# Patient Record
Sex: MALE | Race: WHITE | NOT HISPANIC OR LATINO | URBAN - METROPOLITAN AREA
[De-identification: names, ages, dates, MRNs, and addresses within clinical notes are randomized per-mention and may not be internally consistent; named-entity substitution may affect disease eponyms.]

---

## 2017-07-25 ENCOUNTER — APPOINTMENT (RX ONLY)
Dept: URBAN - METROPOLITAN AREA OTHER 13 | Facility: OTHER | Age: 72
Setting detail: DERMATOLOGY
End: 2017-07-25

## 2017-07-25 DIAGNOSIS — L40.0 PSORIASIS VULGARIS: ICD-10-CM

## 2017-07-25 DIAGNOSIS — L40.59 OTHER PSORIATIC ARTHROPATHY: ICD-10-CM

## 2017-07-25 DIAGNOSIS — Z79.899 OTHER LONG TERM (CURRENT) DRUG THERAPY: ICD-10-CM

## 2017-07-25 PROBLEM — I10 ESSENTIAL (PRIMARY) HYPERTENSION: Status: ACTIVE | Noted: 2017-07-25

## 2017-07-25 PROBLEM — M12.9 ARTHROPATHY, UNSPECIFIED: Status: ACTIVE | Noted: 2017-07-25

## 2017-07-25 PROBLEM — E13.9 OTHER SPECIFIED DIABETES MELLITUS WITHOUT COMPLICATIONS: Status: ACTIVE | Noted: 2017-07-25

## 2017-07-25 PROBLEM — E78.5 HYPERLIPIDEMIA, UNSPECIFIED: Status: ACTIVE | Noted: 2017-07-25

## 2017-07-25 PROBLEM — E03.9 HYPOTHYROIDISM, UNSPECIFIED: Status: ACTIVE | Noted: 2017-07-25

## 2017-07-25 PROCEDURE — 99202 OFFICE O/P NEW SF 15 MIN: CPT | Mod: 25

## 2017-07-25 PROCEDURE — ? HIGH RISK MEDICATION MONITORING

## 2017-07-25 PROCEDURE — 11900 INJECT SKIN LESIONS </W 7: CPT

## 2017-07-25 PROCEDURE — ? TREATMENT REGIMEN

## 2017-07-25 PROCEDURE — ? COUNSELING

## 2017-07-25 PROCEDURE — ? OTHER

## 2017-07-25 PROCEDURE — ? INTRALESIONAL KENALOG

## 2017-07-25 PROCEDURE — ? PRESCRIPTION

## 2017-07-25 RX ORDER — TRIAMCINOLONE ACETONIDE 1 MG/G
OINTMENT TOPICAL
Qty: 1 | Refills: 0 | Status: ERX | COMMUNITY
Start: 2017-07-25

## 2017-07-25 RX ORDER — APREMILAST 30 MG/1
TABLET, FILM COATED ORAL
Qty: 60 | Refills: 1 | Status: ERX | COMMUNITY
Start: 2017-07-25

## 2017-07-25 RX ADMIN — TRIAMCINOLONE ACETONIDE: 1 OINTMENT TOPICAL at 00:00

## 2017-07-25 RX ADMIN — APREMILAST: 30 TABLET, FILM COATED ORAL at 00:00

## 2017-07-25 ASSESSMENT — LOCATION SIMPLE DESCRIPTION DERM
LOCATION SIMPLE: RIGHT INFRAPATELLAR BURSA
LOCATION SIMPLE: RIGHT KNEE
LOCATION SIMPLE: LEFT KNEE
LOCATION SIMPLE: RIGHT PATELLOFEMORAL

## 2017-07-25 ASSESSMENT — BSA PSORIASIS: % BODY COVERED IN PSORIASIS: 10

## 2017-07-25 ASSESSMENT — LOCATION ZONE DERM
LOCATION ZONE: KNEE
LOCATION ZONE: LEG

## 2017-07-25 ASSESSMENT — LOCATION DETAILED DESCRIPTION DERM
LOCATION DETAILED: LEFT KNEE JOINT
LOCATION DETAILED: RIGHT KNEE
LOCATION DETAILED: RIGHT INFRAPATELLAR BURSA
LOCATION DETAILED: RIGHT PATELLOFEMORAL JOINT
LOCATION DETAILED: LEFT KNEE

## 2017-07-25 NOTE — PROCEDURE: OTHER
Other (Free Text): Will send rx to [facility] for prior authorization and foundation support since pt has Medicare
Note Text (......Xxx Chief Complaint.): This diagnosis correlates with the alopecia
Detail Level: Zone

## 2017-07-25 NOTE — HPI: RASH (PSORIASIS)
Do You Have A Family History Of Psoriasis?: no
How Severe Is Your Psoriasis?: mild
Is This A New Presentation, Or A Follow-Up?: Psoriasis
Additional History: Patient has seen Dr. Mckenna several years ago.

## 2017-07-25 NOTE — PROCEDURE: INTRALESIONAL KENALOG
Include Z78.9 (Other Specified Conditions Influencing Health Status) As An Associated Diagnosis?: No
Kenalog Preparation: Kenalog
X Size Of Lesion In Cm (Optional): 0
Detail Level: Simple
Total Volume (Ccs): 2.5
Administered By (Optional): Consuelo Bravo PA-C
Concentration Of Kenalog Solution Injected (Mg/Ml): 5.0
Medical Necessity Clause: This procedure was medically necessary because the lesions that were treated were:

## 2017-12-18 ENCOUNTER — APPOINTMENT (RX ONLY)
Dept: URBAN - METROPOLITAN AREA OTHER 13 | Facility: OTHER | Age: 72
Setting detail: DERMATOLOGY
End: 2017-12-18

## 2017-12-18 DIAGNOSIS — L40.0 PSORIASIS VULGARIS: ICD-10-CM | Status: IMPROVED

## 2017-12-18 DIAGNOSIS — L21.8 OTHER SEBORRHEIC DERMATITIS: ICD-10-CM

## 2017-12-18 DIAGNOSIS — D22 MELANOCYTIC NEVI: ICD-10-CM

## 2017-12-18 DIAGNOSIS — L40.59 OTHER PSORIATIC ARTHROPATHY: ICD-10-CM

## 2017-12-18 PROBLEM — D22.39 MELANOCYTIC NEVI OF OTHER PARTS OF FACE: Status: ACTIVE | Noted: 2017-12-18

## 2017-12-18 PROCEDURE — ? PRESCRIPTION

## 2017-12-18 PROCEDURE — ? TREATMENT REGIMEN

## 2017-12-18 PROCEDURE — ? COUNSELING

## 2017-12-18 PROCEDURE — 99213 OFFICE O/P EST LOW 20 MIN: CPT

## 2017-12-18 PROCEDURE — ? OBSERVATION AND MEASURE

## 2017-12-18 RX ORDER — PIMECROLIMUS 10 MG/G
CREAM TOPICAL
Qty: 1 | Refills: 9 | Status: ERX | COMMUNITY
Start: 2017-12-18

## 2017-12-18 RX ADMIN — PIMECROLIMUS: 10 CREAM TOPICAL at 00:00

## 2017-12-18 ASSESSMENT — LOCATION DETAILED DESCRIPTION DERM
LOCATION DETAILED: GLABELLA
LOCATION DETAILED: LEFT MEDIAL FOREHEAD
LOCATION DETAILED: INFERIOR MID FOREHEAD

## 2017-12-18 ASSESSMENT — LOCATION SIMPLE DESCRIPTION DERM
LOCATION SIMPLE: GLABELLA
LOCATION SIMPLE: INFERIOR FOREHEAD
LOCATION SIMPLE: LEFT FOREHEAD

## 2017-12-18 ASSESSMENT — LOCATION ZONE DERM: LOCATION ZONE: FACE

## 2017-12-18 NOTE — PROCEDURE: TREATMENT REGIMEN
Detail Level: Zone
Continue Regimen: OTEZLA 30 mg bid
Action 1: Continue
Continue Regimen: OTEZLA 30 mg bid as directed\\nTAC CREAM PRN ONLY
Otc Regimen: T gel , T Sal, selsun blue, alternating shampoos

## 2018-11-08 ENCOUNTER — APPOINTMENT (RX ONLY)
Dept: URBAN - METROPOLITAN AREA OTHER 13 | Facility: OTHER | Age: 73
Setting detail: DERMATOLOGY
End: 2018-11-08

## 2018-11-08 DIAGNOSIS — Z71.89 OTHER SPECIFIED COUNSELING: ICD-10-CM

## 2018-11-08 DIAGNOSIS — L40.0 PSORIASIS VULGARIS: ICD-10-CM

## 2018-11-08 DIAGNOSIS — L40.59 OTHER PSORIATIC ARTHROPATHY: ICD-10-CM

## 2018-11-08 PROCEDURE — 99213 OFFICE O/P EST LOW 20 MIN: CPT

## 2018-11-08 PROCEDURE — ? PRESCRIPTION

## 2018-11-08 PROCEDURE — ? SUNSCREEN RECOMMENDATIONS

## 2018-11-08 PROCEDURE — ? OTHER

## 2018-11-08 PROCEDURE — ? TREATMENT REGIMEN

## 2018-11-08 PROCEDURE — ? COUNSELING

## 2018-11-08 RX ORDER — UREA 40 G/100G
CREAM TOPICAL
Qty: 1 | Refills: 2 | Status: ERX | COMMUNITY
Start: 2018-11-08

## 2018-11-08 RX ORDER — CLOBETASOL PROPIONATE 0.5 MG/G
OINTMENT TOPICAL
Qty: 1 | Refills: 1 | Status: ERX | COMMUNITY
Start: 2018-11-08

## 2018-11-08 RX ADMIN — UREA: 40 CREAM TOPICAL at 00:00

## 2018-11-08 RX ADMIN — CLOBETASOL PROPIONATE: 0.5 OINTMENT TOPICAL at 00:00

## 2018-11-08 ASSESSMENT — LOCATION SIMPLE DESCRIPTION DERM: LOCATION SIMPLE: RIGHT SHOULDER

## 2018-11-08 ASSESSMENT — LOCATION DETAILED DESCRIPTION DERM: LOCATION DETAILED: RIGHT ANTERIOR SHOULDER

## 2018-11-08 ASSESSMENT — BSA PSORIASIS: % BODY COVERED IN PSORIASIS: 1

## 2018-11-08 ASSESSMENT — LOCATION ZONE DERM: LOCATION ZONE: ARM

## 2018-11-08 NOTE — PROCEDURE: OTHER
Detail Level: Zone
Note Text (......Xxx Chief Complaint.): This diagnosis correlates with the
Other (Free Text): consider talking to PCP re: changing off carvedilol if possible (negative effect with betablockers and psoriasis)

## 2018-11-08 NOTE — PROCEDURE: TREATMENT REGIMEN
Action 3: Continue
Samples Given: Utopic samples given x 2,sample pack of otezla given also
Continue Regimen: OTEZLA 30 mg bid as directed
Plan: Referred to rheum for arthritis additional management.  Patient is full time rv traveler.... not likely to be able to manage injectable biologic treatment. Briefly discussed.  Discussed potential for rheum to have oral arthritis treatment to help with his pain
Initiate Regimen: Urea\\nClobetasol ointment
Detail Level: Zone
Continue Regimen: OTEZLA 30 mg bid
Plan: Beta blockers can have an effect on psoriasis, if pcp can change this to something else it might help\\nSee a Rheumatologist

## 2022-10-06 NOTE — HPI: MEDICATION (OTEZLA)
Statement Selected
How Severe Is It?: moderate
Is This A New Presentation, Or A Follow-Up?: Follow Up [facility]
How Many Months Of Otezla Have You Completed?: 3